# Patient Record
(demographics unavailable — no encounter records)

---

## 2024-12-04 NOTE — HISTORY OF PRESENT ILLNESS
[FreeTextEntry1] : I have the  pleasure to see Mr KINNEY today for cardiology evaluation. He is a 57 year old male patient with PMHx of HTN, DL, ex smoker,  sent by PMD for abnormal EKG. Patient seen currently stable, denies chest pain, sob, gilbert or palpitations, no syncope. BP stable  EKG sinus rhythm no acute ischemic changes  at baseline active with no problem 10/1/2024: echo normal EF, 55-60%, mild aortic dilatation ~ 4 cm, no significant valve disease 10/1/2024 EST negative for ischemia, able to achieve 9.8 METs ex smoker, no alcohol , no illicit drugs no family hx of premature cardiac disease.

## 2024-12-04 NOTE — ASSESSMENT
[FreeTextEntry1] : 56 year old male patient ex smoker HTN, DL, here for cardiac evaluation.   #HTN controlled #DL not well controlled,  on vascepa started 1 month ago by PMD #ex smoker #Ascending aortic dilatation 4cm   Plan: continue losartan/HCTZ continue vascepa for now, explained heart healthy low fat diet, exercise and weight loss, lab test not done , reordered  BP repeated by end of encounter 130/65 advised to monitor BP at home  heart healthy diet exercise and weight control CT chest  Echo and EST result discussed with patient in detail  all questions are answered   follow up in 3-4 months.

## 2025-05-06 NOTE — HISTORY OF PRESENT ILLNESS
[FreeTextEntry1] : The patient is a 57-year-old male who presents for second opinion consult for symptomatic varicose veins.  The patient has bilateral lower extremity venous stasis skin changes with dermatitis and discoloration of his medial malleoli regions bilaterally.  The patient is skin looks ominous for developing venous stasis ulcers in the future.  The patient also complains of pruritus to his lower extremities bilaterally fatigue, and throbbing of his legs when he stands for long periods of time.

## 2025-05-06 NOTE — ASSESSMENT
[FreeTextEntry1] : The patient is a 57-year-old male with bilateral lower extremity saphenous vein incompetency and significant varicose veins with venous insufficiency venous stasis skin changes.  The patient also has venous stasis dermatitis requiring treatment.  The patient complains of pruritus to his legs.  I believe the patient will benefit greatly from endovenous laser ablation of his saphenous veins bilaterally as well as a micro stab phlebectomy in the future given the size of his varicosities.  The patient is at risk for developing venous stasis ulcers.   Patient understands the risks of recurrence, bleeding, infection, thrombosis, nerve injury, wound complications, injury to the catheterized vessel and need for additional procedures.  Clobetasol cream EVLT bilateral lower extremities Compression stocking therapy 20 to 30 mmHg compression knee-high's  I spent a total of 40 minutes examining the patient discussing the test results and providing a treatment plan.  I, Dr. Demarcus Jimenes, personally performed the evaluation and management (E/M) services for this new patient. That E/M includes conducting the clinically appropriate initial history &/or exam, assessing all conditions, and establishing the plan of care. Today, my MELISSA, Rosita Lawton PA-C, was here to observe my evaluation and management service for this patient & follow plan of care established by me going forward.

## 2025-05-06 NOTE — PHYSICAL EXAM
[Ankle Swelling On The Right] : mild [Varicose Veins Of Lower Extremities] : bilaterally [Ankle Swelling Bilaterally] : severe [] : bilaterally [Ankle Swelling On The Left] : moderate [Ankle Swelling (On Exam)] : not present

## 2025-05-06 NOTE — DATA REVIEWED
[FreeTextEntry1] : Venous duplex right there is no evidence of acute deep venous thrombosis or superficial thrombophlebitis.  All major veins are patent and compressible with normal Doppler waveform analysis.  The reflux exam was performed in the standing position.  The greater saphenous vein is incompetent.  There is a straight segment from the groin to the knee.  Diameter at the junction is 10 mm.  The diameter of the knee is 8.4 mm.  The refill time is 6.5 seconds at the knee.  There are large incompetent branches that come off the greater saphenous vein at the level of proximal calf with largest diameter of 5.9 to 7.2 mm.  The small saphenous vein is negative for reflux no incompetent perforators are seen.  Negative for deep system reflux.  Left there is no evidence of acute deep venous thrombosis or superficial thrombophlebitis all major veins are patent and compressible with normal spectral waveform analysis.  The reflux exam was performed in the standing position.  The greater saphenous vein is incompetent.  There is a straight segment from the groin to the knee with a diameter at the junction measuring 1.1 cm.  The diameter at the knee is 8.6 mm.  The refill time is 5.1 seconds at the knee.  There are large incompetent branches that come off the greater saphenous vein at the level of the proximal calf with the largest diameter measuring 4.9 to 7.3 mm.  The small saphenous vein is negative for reflux no incompetent perforators are seen.  Negative for deep system reflux

## 2025-06-27 NOTE — PROCEDURE
[FreeTextEntry3] : Procedural Report Name: SLIM KINNEY   MRN: [id number]  : Oct 24 1967  Requesting MD: LOS SINGH  Exam:  EVLT - Laser DOS: 2025  History: 57 year old M with insufficiency of right great saphenous vein referred for endovenous laser ablation of the saphenous vein.  Anesthesia:  local only  Procedure time: 1hour  Procedure and Findings:  Informed consent was obtained from the patient prior to this procedure.  Continuous physiological monitoring of the patients vital signs was performed throughout the procedure.  The patient was brought into the procedure suite.  The patient was then placed supine on the procedure table and the leg prepped and draped in the usual sterile fashion.  The saphenous vein was accessed in the thigh under ultrasound guidance using a 21 gauge micropuncture needle.  The needle was exchanged over a 0.018 inch guide wire for a 4 Telugu tapered dilator.  After exchanging for a 0.035 inch guide wire, a five Telugu 45 cm long sheath was advanced to the saphenofemoral junction.  An Ang[employer] laser fiber was then advanced through the sheath to the saphenofemoral junction.  Tumescent anesthesia using a .25% lidocaine solution was administered along the entire course of the vein under ultrasound guidance.  Compression of the vein was noted throughout its course.  After re-determining that the tip of the fiber was below the saphenofemoral junction, the laser was activated to 8 Neil energy and slowly withdrawn with the sheath.  Total pullback time was approximately 203 seconds.  Total energy delivered was 1623 J.  Total vein length treated was 36 cm.  The laser was deactivated approximately 2 cm proximal to the puncture site.  The sheath and fiber was then removed.  Repeat ultrasound exam demonstrate no flow within the saphenous vein.  The common femoral vein remains patent.  A grade 2 thigh high compression stocking was then applied.  The patient tolerated the procedure without incident.  Impression:  Successful endovenous laser ablation of right greater saphenous vein as described above.

## 2025-07-08 NOTE — ASSESSMENT
[FreeTextEntry1] : 56 y/o M with h/o venous insufficiency and GSV incompetency on duplex underwent R GSV closure few weeks ago, presents for follow up.  Duplex showed no evidence of DVT and R GSV is closed.  He is scheduled for L GSV EVLT on 7/25/25.  Continue use of compression stockings.   I, Dr. Demarcus Jimenes, personally performed the evaluation and management (E/M) services for this established patient who presents today with (a) new problem(s)/exacerbation of (an) existing condition(s). That E/M includes conducting the clinically appropriate interval history &/or exam, assessing all new/exacerbated conditions, and establishing a new plan of care. Today, my MELISSA, Anahi Olsen PA-C, was here to observe my evaluation and management service for this new problem/exacerbated condition and follow the plan of care established by me going forward.

## 2025-07-08 NOTE — DATA REVIEWED
[FreeTextEntry1] : I performed a venous duplex which was medically necessary to evaluate for GSV closure. It showed no evidence of DVT and GSV is closed.

## 2025-07-08 NOTE — HISTORY OF PRESENT ILLNESS
[FreeTextEntry1] : 56 y/o M with h/o venous insufficiency and GSV incompetency on duplex underwent R GSV closure few weeks ago, presents for follow up.

## 2025-07-25 NOTE — PROCEDURE
[FreeTextEntry3] : Procedural Report Name: SLIM KINNEY   MRN: [id number]  : Oct 24 1967  Requesting MD: LOS SINGH  Exam:  EVLT - Laser DOS: 2025  History: 57 year old M with insufficiency of left great saphenous vein referred for endovenous laser ablation of the saphenous vein.  Anesthesia:  local only  Procedure time: 1hour  Procedure and Findings:  Informed consent was obtained from the patient prior to this procedure.  Continuous physiological monitoring of the patients vital signs was performed throughout the procedure.  The patient was brought into the procedure suite.  The patient was then placed supine on the procedure table and the leg prepped and draped in the usual sterile fashion.  The saphenous vein was accessed in the thigh under ultrasound guidance using a 21 gauge micropuncture needle.  The needle was exchanged over a 0.018 inch guide wire for a 4 Italian tapered dilator.  After exchanging for a 0.035 inch guide wire, a five Italian 45 cm long sheath was advanced to the saphenofemoral junction.  An AngioDynamics laser fiber was then advanced through the sheath to the saphenofemoral junction.  Tumescent anesthesia using a .25% lidocaine solution was administered along the entire course of the vein under ultrasound guidance.  Compression of the vein was noted throughout its course.  After re-determining that the tip of the fiber was below the saphenofemoral junction, the laser was activated to 8 Neil energy and slowly withdrawn wit the sheath.  Total pullback time was approximately 190 seconds.  Total energy delivered was 1521 J.  Total vein length treated was 32 cm.  The laser was deactivated approximately 2 cm proximal to the puncture site.  The sheath and fiber was then removed.  Repeat ultrasound exam demonstrate no flow within the saphenous vein.  The common femoral vein remains patent.  A grade 2 thigh high compression stocking was then applied.  The patient tolerated the procedure without incident.  Impression:  Successful endovenous laser ablation of left greater saphenous vein as described above.